# Patient Record
Sex: FEMALE | Race: WHITE | Employment: FULL TIME | URBAN - METROPOLITAN AREA
[De-identification: names, ages, dates, MRNs, and addresses within clinical notes are randomized per-mention and may not be internally consistent; named-entity substitution may affect disease eponyms.]

---

## 2024-05-06 ENCOUNTER — OFFICE VISIT (OUTPATIENT)
Age: 49
End: 2024-05-06

## 2024-05-06 VITALS
SYSTOLIC BLOOD PRESSURE: 139 MMHG | HEART RATE: 93 BPM | BODY MASS INDEX: 23.46 KG/M2 | TEMPERATURE: 98.8 F | DIASTOLIC BLOOD PRESSURE: 94 MMHG | RESPIRATION RATE: 18 BRPM | OXYGEN SATURATION: 99 % | HEIGHT: 66 IN | WEIGHT: 146 LBS

## 2024-05-06 DIAGNOSIS — R03.0 ELEVATED BLOOD PRESSURE READING: ICD-10-CM
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
## 2024-05-06 DIAGNOSIS — S80.862A INSECT BITE OF LEFT LOWER LEG, INITIAL ENCOUNTER: ICD-10-CM

## 2024-05-06 DIAGNOSIS — Z23 NEED FOR DIPHTHERIA-TETANUS-PERTUSSIS (TDAP) VACCINE: ICD-10-CM

## 2024-05-06 DIAGNOSIS — S81.802A WOUND OF LEFT LOWER EXTREMITY, INITIAL ENCOUNTER: Primary | ICD-10-CM

## 2024-05-06 RX ORDER — LEVOTHYROXINE SODIUM 0.15 MG/1
150 TABLET ORAL DAILY
COMMUNITY

## 2024-05-06 RX ORDER — LIOTHYRONINE SODIUM 5 UG/1
5 TABLET ORAL DAILY
COMMUNITY

## 2024-05-06 RX ORDER — DOXYCYCLINE HYCLATE 100 MG/1
100 CAPSULE ORAL 2 TIMES DAILY
COMMUNITY
Start: 2024-05-04

## 2024-05-06 ASSESSMENT — ENCOUNTER SYMPTOMS
RESPIRATORY NEGATIVE: 1
EYES NEGATIVE: 1
ALLERGIC/IMMUNOLOGIC NEGATIVE: 1
GASTROINTESTINAL NEGATIVE: 1

## 2024-05-06 NOTE — PATIENT INSTRUCTIONS
Sun Gonzales  please note following recommendations today due to your elevated BP reading: rescreen BP within a minimum of 2 weeks and lifestyle modifications to include: dietary sodium restriction and increase physical activity.

## 2024-05-06 NOTE — PROGRESS NOTES
2024   Sun Gonzales (: 1975) is a 48 y.o. female, New patient, here for evaluation of the following chief complaint(s):  Insect Bite (Quarter insect bite L leg happened last week, bite has began to become red painful and swollen and is oozing puss. Has started taking doxy. /)     ASSESSMENT/PLAN:  Below is the assessment and plan developed based on review of pertinent history, physical exam, labs, studies, and medications.  1. Wound of left lower extremity, initial encounter  -     mupirocin (BACTROBAN) 2 % ointment; Apply topically 3 times daily Apply topically 3 times daily., Topical, 3 TIMES DAILY Starting Mon 2024, Disp-1 g, R-0, Normal  -     Culture, Wound; Future  2. Insect bite of left lower leg, initial encounter  -     Tdap, BOOSTRIX, (age 10 yrs+), IM  3. Need for diphtheria-tetanus-pertussis (Tdap) vaccine  -     Tdap, BOOSTRIX, (age 10 yrs+), IM  4. Elevated blood pressure reading  -     Ambulatory referral to Internal Medicine         Handout given with care instructions  2. OTC for symptom management. Increase fluid intake, ensure adequate nutritional intake.  3. Follow up with PCP as needed.  4. Go to ED with development of any acute symptoms.     Follow up:  Return in about 4 weeks (around 6/3/2024), or if symptoms worsen or fail to improve, for BP Check with medication change.  Follow up immediately for any new, worsening or changes or if symptoms are not improving over the next 5-7 days.     SUBJECTIVE/OBJECTIVE:    Insect Bite       Diagnoses and all orders for this visit:  Wound of left lower extremity, initial encounter  Insect bite of left lower leg, initial encounter  Insect Bite (Quarter insect bite L leg happened last week, bite has began to become red painful and swollen and is oozing puss. Has started taking doxy. /)        is a 48 y.o. female who notes recent onset of a skin problem.  The details are as follows:      ONSET: 1 week ago    LOCATION: Left

## 2024-05-09 ENCOUNTER — TELEPHONE (OUTPATIENT)
Age: 49
End: 2024-05-09

## 2024-05-09 LAB
BACTERIA SPEC CULT: ABNORMAL
BACTERIA SPEC CULT: ABNORMAL
GRAM STN SPEC: ABNORMAL
GRAM STN SPEC: ABNORMAL
SERVICE CMNT-IMP: ABNORMAL

## 2024-05-09 NOTE — TELEPHONE ENCOUNTER
Called in to confirm lab results and make sure the medicine she was prescribed would help. Advised to complete full course of anti-biotics, monitor symptoms and follow up with PCP

## 2024-05-13 ENCOUNTER — TELEPHONE (OUTPATIENT)
Age: 49
End: 2024-05-13

## 2024-05-13 DIAGNOSIS — A49.1 STREPTOCOCCAL INFECTION: Primary | ICD-10-CM

## 2024-05-13 RX ORDER — AMOXICILLIN AND CLAVULANATE POTASSIUM 875; 125 MG/1; MG/1
1 TABLET, FILM COATED ORAL 2 TIMES DAILY
Qty: 20 TABLET | Refills: 0 | Status: SHIPPED | OUTPATIENT
Start: 2024-05-13 | End: 2024-05-23

## 2024-05-13 NOTE — TELEPHONE ENCOUNTER
Please call inform patient of culture results.  Culture grows heavy streptococci.  Prescription Augmentin faxed to pharmacy intact.  Patient to follow-up with PCP if no improvement.  Thanks

## 2024-12-04 ENCOUNTER — OFFICE VISIT (OUTPATIENT)
Age: 49
End: 2024-12-04

## 2024-12-04 VITALS
OXYGEN SATURATION: 98 % | BODY MASS INDEX: 23.14 KG/M2 | SYSTOLIC BLOOD PRESSURE: 133 MMHG | DIASTOLIC BLOOD PRESSURE: 86 MMHG | HEART RATE: 100 BPM | HEIGHT: 66 IN | TEMPERATURE: 98.7 F | RESPIRATION RATE: 16 BRPM | WEIGHT: 144 LBS

## 2024-12-04 DIAGNOSIS — R05.1 ACUTE COUGH: Primary | ICD-10-CM

## 2024-12-04 ASSESSMENT — ENCOUNTER SYMPTOMS
SHORTNESS OF BREATH: 0
COUGH: 1
VOMITING: 0
NAUSEA: 0
DIARRHEA: 0

## 2024-12-04 NOTE — PATIENT INSTRUCTIONS
Patient will start Mucinex DM 12-hour formulation with plenty of fluids, add Zyrtec to her Flonase regime and follow-up if no improvement with PCP in 1 week

## 2024-12-04 NOTE — PROGRESS NOTES
Subjective     Chief Complaint   Patient presents with    Cough     Cough and SOB since 10/31. Pt did a telehealth and was rx augmentin and prednisolone. Sx have not gotten better.          Cough  Associated symptoms include postnasal drip. Pertinent negatives include no chills, fever or shortness of breath.    49-year-old female who presents for cough going on for the past few days.  Patient had called telehealth with prescribed Augmentin and a course of steroid.  She finished all at up to few days later she started having a cough again.  No fever chills nausea vomiting diarrhea.  Patient has not been taking any type of cough medicine.    History reviewed. No pertinent past medical history.    History reviewed. No pertinent surgical history.    History reviewed. No pertinent family history.    Allergies   Allergen Reactions    Bactrim [Sulfamethoxazole-Trimethoprim] Nausea Only       Social History     Tobacco Use    Smoking status: Every Day     Types: Cigarettes    Smokeless tobacco: Never   Substance Use Topics    Alcohol use: Yes    Drug use: Not Currently       Vitals:    12/04/24 1648   BP: 133/86   Pulse:    Resp:    Temp:    SpO2:        Review of Systems   Constitutional:  Negative for chills and fever.   HENT:  Positive for postnasal drip. Negative for congestion.    Respiratory:  Positive for cough. Negative for shortness of breath.    Gastrointestinal:  Negative for diarrhea, nausea and vomiting.       Objective     Physical Exam  Vitals reviewed.   Constitutional:       Appearance: Normal appearance.   HENT:      Right Ear: Tympanic membrane normal.      Left Ear: Tympanic membrane normal.      Nose: Nose normal.      Mouth/Throat:      Mouth: Mucous membranes are moist.      Pharynx: Oropharynx is clear. No oropharyngeal exudate or posterior oropharyngeal erythema.   Eyes:      Extraocular Movements: Extraocular movements intact.      Conjunctiva/sclera: Conjunctivae normal.      Pupils: Pupils are